# Patient Record
Sex: MALE | Race: WHITE | NOT HISPANIC OR LATINO | Employment: FULL TIME | URBAN - METROPOLITAN AREA
[De-identification: names, ages, dates, MRNs, and addresses within clinical notes are randomized per-mention and may not be internally consistent; named-entity substitution may affect disease eponyms.]

---

## 2021-07-02 ENCOUNTER — HOSPITAL ENCOUNTER (EMERGENCY)
Facility: CLINIC | Age: 26
Discharge: HOME | End: 2021-07-02
Attending: EMERGENCY MEDICINE | Admitting: EMERGENCY MEDICINE

## 2021-07-02 VITALS
DIASTOLIC BLOOD PRESSURE: 98 MMHG | WEIGHT: 160 LBS | SYSTOLIC BLOOD PRESSURE: 144 MMHG | TEMPERATURE: 98.7 F | HEART RATE: 112 BPM | RESPIRATION RATE: 16 BRPM | OXYGEN SATURATION: 96 %

## 2021-07-02 DIAGNOSIS — S61.012A LACERATION OF LEFT THUMB WITHOUT FOREIGN BODY WITHOUT DAMAGE TO NAIL, INITIAL ENCOUNTER: ICD-10-CM

## 2021-07-02 PROCEDURE — 12002 RPR S/N/AX/GEN/TRNK2.6-7.5CM: CPT

## 2021-07-02 PROCEDURE — 99283 EMERGENCY DEPT VISIT LOW MDM: CPT

## 2021-07-02 ASSESSMENT — ENCOUNTER SYMPTOMS: WOUND: 1

## 2021-07-02 NOTE — ED PROVIDER NOTES
History   Chief Complaint:  Laceration       HPI   Timmy Mahoney is a 26 year old male who presents with left finger laceration from axe throwing a few hours ago. The patient complained of pain in the area and believed he needed stitches, prompting him to come to the ED.     Review of Systems   Skin: Positive for wound.   All other systems reviewed and are negative.        Allergies:  The patient has no known allergies.     Medications:  The patient has no current outpatient medications.    Past Medical History:    The patient denies past medical history.     Social History:  Presents to the ED with friends.    Physical Exam     Patient Vitals for the past 24 hrs:   BP Temp Temp src Pulse Resp SpO2 Weight   07/02/21 0228 (!) 144/98 98.7  F (37.1  C) Oral 112 16 96 % 72.6 kg (160 lb)       Physical Exam  General: Appears well-developed and well-nourished.   Head: No signs of trauma.   CV: Normal rate and regular rhythm.    Resp: Effort normal and breath sounds normal. No respiratory distress.   MSK: Normal range of motion. No bony tenderness.  Neuro: The patient is alert and oriented. Speech normal.  Skin: Skin is warm and dry. 3.5cm laceration to pad of left thumb.  No foreign body or nail involvement.  Psych: normal mood and affect. behavior is normal.       Emergency Department Course     Procedures         Laceration Repair        LACERATION:  A simple clean 3.5 cm laceration.      LOCATION:  Left thumb pad      ANESTHESIA:  Local using prilocaine 0.25% total of 3 mLs      PREPARATION:  Irrigation and Scrubbing with Normal Saline and Shur Clens      DEBRIDEMENT:  no debridement      CLOSURE:  Wound was closed with One Layer.  Skin closed with 6 x 4.0 Vicryl Rapide Sutures using interrupted sutures.      Emergency Department Course:    Reviewed:  I reviewed nursing notes, vitals, past medical history and care everywhere    Assessments:  231 I obtained history and examined the patient as noted above.    250 I rechecked the patient and explained findings.     Disposition:  The patient was discharged to home.       Impression & Plan     Medical Decision Making:  Timmy Mahoney is a 26-year-old gentleman presents due to a laceration to his left thumb.  He apparently had  been doing ax throwing tonight and accidentally cut himself.  From evaluation there was a laceration to the pad of the left thumb.  There is no signs of bony injury, foreign object, and there is no nail injury.  Wound was thoroughly cleaned and sutured closed with good effect.  Patient was discharged home with the instruction to monitor for any signs of infection.      Diagnosis:    ICD-10-CM    1. Laceration of left thumb without foreign body without damage to nail, initial encounter  S61.012A        Scribe Disclosure:  I, Fabeinne Wadsworth, am serving as a scribe at 2:31 AM on 7/2/2021 to document services personally performed by Spenser Sterling MD based on my observations and the provider's statements to me.              Spenser Sterling MD  07/02/21 1560

## 2021-07-02 NOTE — ED NOTES
Cleaned pts wound with a full bottle of sterile water.   Dressed pt wound applied bacitracin and wrapped in nonadherent patch then applied gauze to keep on the dressings.

## 2021-07-02 NOTE — ED NOTES
Bed: ED06  Expected date: 7/2/21  Expected time: 2:18 AM  Means of arrival:   Comments:  Triage-[facility]

## 2021-10-17 ENCOUNTER — HEALTH MAINTENANCE LETTER (OUTPATIENT)
Age: 26
End: 2021-10-17

## 2022-10-03 ENCOUNTER — HEALTH MAINTENANCE LETTER (OUTPATIENT)
Age: 27
End: 2022-10-03

## 2023-02-11 ENCOUNTER — HEALTH MAINTENANCE LETTER (OUTPATIENT)
Age: 28
End: 2023-02-11

## 2024-03-09 ENCOUNTER — HEALTH MAINTENANCE LETTER (OUTPATIENT)
Age: 29
End: 2024-03-09